# Patient Record
Sex: MALE | Race: AMERICAN INDIAN OR ALASKA NATIVE
[De-identification: names, ages, dates, MRNs, and addresses within clinical notes are randomized per-mention and may not be internally consistent; named-entity substitution may affect disease eponyms.]

---

## 2021-03-29 ENCOUNTER — HOSPITAL ENCOUNTER (EMERGENCY)
Dept: HOSPITAL 5 - ED | Age: 26
Discharge: HOME | End: 2021-03-29
Payer: SELF-PAY

## 2021-03-29 VITALS — SYSTOLIC BLOOD PRESSURE: 127 MMHG | DIASTOLIC BLOOD PRESSURE: 60 MMHG

## 2021-03-29 DIAGNOSIS — K05.10: ICD-10-CM

## 2021-03-29 DIAGNOSIS — K02.9: Primary | ICD-10-CM

## 2021-03-29 DIAGNOSIS — Z79.899: ICD-10-CM

## 2021-03-29 PROCEDURE — 99282 EMERGENCY DEPT VISIT SF MDM: CPT

## 2021-03-29 NOTE — EMERGENCY DEPARTMENT REPORT
ED ENT HPI





- General


Chief complaint: Dental/Oral


Stated complaint: ABCESS


Time Seen by Provider: 03/29/21 12:03


Source: patient


Mode of arrival: Ambulatory


Limitations: No Limitations





- History of Present Illness


Initial comments: 





Patient is a 25-year-old male who presents emergency room with complaints of 

left lower dental pain that began yesterday.  He states that he has broken teeth

present in that region for several years.  He states that over the last couple 

days he has had pain and swelling to the gumline.  He states he last saw a 

dentist a year ago.  He states he has an appointment with his dentist on 

4/1/2021.  He denies any fever, difficulty swallowing, difficulty breathing, 

facial swelling, vomiting, chills.  Patient denies any past medical history.  No

allergies medications.





- Related Data


                                  Previous Rx's











 Medication  Instructions  Recorded  Last Taken  Type


 


Chlorhexidine Mouthwash [Peridex] 15 ml MM BID #1 bottle 03/29/21 Unknown Rx


 


Naproxen [EC-Naprosyn] 500 mg PO BID PRN #14 tablet. 03/29/21 Unknown Rx


 


Penicillin Vk [Veetids TAB] 500 mg PO QID 7 Days #56 tablet 03/29/21 Unknown Rx











                                    Allergies











Allergy/AdvReac Type Severity Reaction Status Date / Time


 


No Known Allergies Allergy   Unverified 03/29/21 11:32














ED Dental HPI





- General


Chief complaint: Dental/Oral


Stated complaint: ABCESS


Time Seen by Provider: 03/29/21 12:03


Source: patient


Mode of arrival: Ambulatory


Limitations: No Limitations





- Related Data


                                  Previous Rx's











 Medication  Instructions  Recorded  Last Taken  Type


 


Chlorhexidine Mouthwash [Peridex] 15 ml MM BID #1 bottle 03/29/21 Unknown Rx


 


Naproxen [EC-Naprosyn] 500 mg PO BID PRN #14 tablet. 03/29/21 Unknown Rx


 


Penicillin Vk [Veetids TAB] 500 mg PO QID 7 Days #56 tablet 03/29/21 Unknown Rx











                                    Allergies











Allergy/AdvReac Type Severity Reaction Status Date / Time


 


No Known Allergies Allergy   Unverified 03/29/21 11:32














ED Review of Systems


ROS: 


Stated complaint: ABCESS


Other details as noted in HPI





Comment: All other systems reviewed and negative





ED Past Medical Hx





- Past Medical History


Previous Medical History?: No





- Surgical History


Past Surgical History?: No





- Social History


Smoking Status: Never Smoker


Substance Use Type: None





- Medications


Home Medications: 


                                Home Medications











 Medication  Instructions  Recorded  Confirmed  Last Taken  Type


 


Chlorhexidine Mouthwash [Peridex] 15 ml MM BID #1 bottle 03/29/21  Unknown Rx


 


Naproxen [EC-Naprosyn] 500 mg PO BID PRN #14 tablet. 03/29/21  Unknown Rx


 


Penicillin Vk [Veetids TAB] 500 mg PO QID 7 Days #56 tablet 03/29/21  Unknown Rx














ED Physical Exam





- General


Limitations: No Limitations


General appearance: alert, in no apparent distress





- Head


Head exam: Present: atraumatic, normocephalic





- Eye


Eye exam: Present: normal appearance





- ENT


ENT exam: Present: mucous membranes moist, other (dental carries with missing 

teeth present to the left lower molars, there is induration of the associated 

gumline, no obvious area of fluctuance, uvula is midline, no uvular edema or 

deviation, no trismus, no tongue elevation, no muffled voice, no submandibular 

edema)





- Respiratory


Respiratory exam: Absent: respiratory distress, accessory muscle use





- Neurological Exam


Neurological exam: Present: alert, oriented X3





- Psychiatric


Psychiatric exam: Present: normal affect, normal mood





- Skin


Skin exam: Present: warm, dry, intact





ED Course


                                   Vital Signs











  03/29/21





  11:35


 


Temperature 98.6 F


 


Pulse Rate 88


 


Respiratory 16





Rate 


 


Blood Pressure 127/60


 


O2 Sat by Pulse 99





Oximetry 














ED Medical Decision Making





- Medical Decision Making





Patient is a 25-year-old male who presents emergency room with complaints of 

left lower dental pain that began yesterday.  He states that he has broken teeth

present in that region for several years.  He states that over the last couple 

days he has had pain and swelling to the gumline.  He states he last saw a 

dentist a year ago.  He states he has an appointment with his dentist on 

4/1/2021.  He denies any fever, difficulty swallowing, difficulty breathing, 

facial swelling, vomiting, chills.  Patient denies any past medical history.  No

allergies medications. vitals are normal. on exam:dental carries with missing 

teeth present to the left lower molars, there is induration of the associated 

gumline, no obvious area of fluctuance, uvula is midline, no uvular edema or 

deviation, no trismus, no tongue elevation, no muffled voice, no submandibular 

edema.  Examination appears consistent with infected dental caries and 

gingivitis.  No signs of Cain's, facial abscess, facial cellulitis at this 

time.  Patient given prescription for penicillin VK, chlorhexidine mouthwash, 

naproxen.  Advised patient Please use medication as prescribed.  Gargle with 

warm salt water.  Follow-up with your dentist.  It is very important that you 

follow-up.  Return to emergency room for any new or worsening symptoms.


Critical care attestation.: 


If time is entered above; I have spent that time in minutes in the direct care 

of this critically ill patient, excluding procedure time.








ED Disposition


Clinical Impression: 


 Infected dental caries, Gingivitis





Disposition: DC-01 TO HOME OR SELFCARE


Is pt being admited?: No


Does the pt Need Aspirin: No


Condition: Stable


Additional Instructions: 


Please use medication as prescribed.  Gargle with warm salt water.  Follow-up 

with your dentist.  It is very important that you follow-up.  Return to 

emergency room for any new or worsening symptoms.


Prescriptions: 


Naproxen [EC-Naprosyn] 500 mg PO BID PRN #14 tablet.dr


 PRN Reason: pain


Chlorhexidine Mouthwash [Peridex] 15 ml MM BID #1 bottle


Penicillin Vk [Veetids TAB] 500 mg PO QID 7 Days #56 tablet


Referrals: 


your, dentist [Other] - 2-3 Days


Time of Disposition: 12:08


Print Language: ENGLISH

## 2021-04-13 ENCOUNTER — HOSPITAL ENCOUNTER (EMERGENCY)
Dept: HOSPITAL 5 - ED | Age: 26
Discharge: LEFT BEFORE DISCHARGE | End: 2021-04-13
Payer: SELF-PAY

## 2021-04-13 VITALS — SYSTOLIC BLOOD PRESSURE: 143 MMHG | DIASTOLIC BLOOD PRESSURE: 53 MMHG

## 2021-04-13 DIAGNOSIS — Z53.21: ICD-10-CM

## 2021-04-13 DIAGNOSIS — T78.40XA: Primary | ICD-10-CM

## 2021-04-13 NOTE — EMERGENCY DEPARTMENT REPORT
Chief Complaint: Medical Clearance


Stated Complaint: ALLGERIES


Time Seen by Provider: 04/13/21 10:45





- HPI


History of Present Illness: 





Patient is a 25-year-old male who presents emergency room with complaints of an 

exacerbation of his seasonal allergies that began this morning.  He states that 

he has a history of seasonal allergies whenever the pollen comes out.  He has 

associated itching, watering eyes, rhinorrhea, sinus pressure, headache.  He 

states all his symptoms began this morning.  He states he took a Benadryl and 

Claritin this morning.  He states that he was unable to go to his probation 

class this morning and needed an excuse.  He denies any fever, nausea, vomiting,

diarrhea, chest pain, shortness of breath.  He denies any known sick contacts or

recent travel.  No other past medical history.  No allergies to medications.  He

states he has been intermittently been on Claritin for several years.








Vitals are stable








On exam:


Non toxic appearing, no acute distress


atraumatic, normocephalic


normal appearance of the eyes, PERRL, EOMI, no periorbital edema or ecchymosis


moist mucus membranes, normal oropharynx, normal TMs and canals, pale boggy 

turbinates with small amount of clear nasal drainage bilaterally, no erythema, 

no purulent drainage, no sinus ttp bilaterally


regular heart rate and rhythm, no gallops, no rubs, no murmurs


breath sounds are clear bilaterally, no w/r/r, no respiratory distress, no 

accessory muscle use, no stridor


A&O x4, no focal neuro deficit


skin is warm, dry, intact











Patient is presenting with signs of allergic rhinitis


No signs of bacterial sinusitis


Advised patient Please use Zyrtec or Allegra over-the-counter.  Increase your 

water intake.  Please use Flonase nasal spray.  May use a humidifier.  Please 

avoid your allergy triggers.  Follow-up with your primary care doctor.  Return 

to emergency room for any new or worsening symptoms.


Patient referred to appropriate resources


Discuss strict return precautions








Medical screening examination performed and there is no threat to life or limb 

at this time





- Exam


Vital Signs: 


                                   Vital Signs











  04/13/21





  10:07


 


Temperature 99.1 F


 


Pulse Rate 100 H


 


Respiratory 18





Rate 


 


Blood Pressure 151/85





[Right] 


 


O2 Sat by Pulse 96





Oximetry 








                                   Vital Signs











  04/13/21 04/13/21





  10:07 11:03


 


Temperature 99.1 F 


 


Pulse Rate 100 H 97 H


 


Respiratory 18 20





Rate  


 


Blood Pressure  143/53


 


Blood Pressure 151/85 





[Right]  


 


O2 Sat by Pulse 96 95





Oximetry  











MSE screening note: 


Focused history and physical exam performed.


Due to findings the following was ordered:











ED Disposition for MSE


Clinical Impression: 


Allergies


Qualifiers:


 Encounter type: initial encounter Qualified Code(s): T78.40XA - Allergy, 

unspecified, initial encounter





Disposition: Z-07 MED SCREENING EXAM-LEFT


Is pt being admited?: No


Does the pt Need Aspirin: No


Condition: Stable


Instructions:  Allergies, Adult, Easy-to-Read


Additional Instructions: 


Please use Zyrtec or Allegra over-the-counter.  Increase your water intake.  

Please use Flonase nasal spray.  May use a humidifier.  Please avoid your 

allergy triggers.  Follow-up with your primary care doctor.  Return to emergency

 room for any new or worsening symptoms.


Referrals: 


LUCIE WING MD [Staff Physician] - 3-5 Days


[facility] [Provider Group] - 3-5 Days


[facility], [LAB/CONTRACT] - 3-5 Days


[facility] [Outside] - 3-5 Days


Forms:  Work/School Release Form(ED)


Time of Disposition: 10:52


Print Language: ENGLISH

## 2021-12-14 ENCOUNTER — HOSPITAL ENCOUNTER (EMERGENCY)
Dept: HOSPITAL 5 - ED | Age: 26
Discharge: LEFT BEFORE DISCHARGE | End: 2021-12-14
Payer: SELF-PAY

## 2021-12-14 VITALS — DIASTOLIC BLOOD PRESSURE: 65 MMHG | SYSTOLIC BLOOD PRESSURE: 144 MMHG

## 2021-12-14 DIAGNOSIS — M79.18: ICD-10-CM

## 2021-12-14 DIAGNOSIS — R74.8: ICD-10-CM

## 2021-12-14 DIAGNOSIS — E86.0: Primary | ICD-10-CM

## 2021-12-14 DIAGNOSIS — R74.01: ICD-10-CM

## 2021-12-14 DIAGNOSIS — R80.9: ICD-10-CM

## 2021-12-14 DIAGNOSIS — Z79.899: ICD-10-CM

## 2021-12-14 DIAGNOSIS — E80.7: ICD-10-CM

## 2021-12-14 LAB
ALBUMIN SERPL-MCNC: 5 G/DL (ref 3.9–5)
ALT SERPL-CCNC: 37 UNITS/L (ref 7–56)
BASOPHILS # (AUTO): 0.1 K/MM3 (ref 0–0.1)
BASOPHILS NFR BLD AUTO: 1.6 % (ref 0–1.8)
BILIRUB UR QL STRIP: (no result)
BLOOD UR QL VISUAL: (no result)
BUN SERPL-MCNC: 11 MG/DL (ref 9–20)
BUN/CREAT SERPL: 11 %
CALCIUM SERPL-MCNC: 9.4 MG/DL (ref 8.4–10.2)
EOSINOPHIL # BLD AUTO: 0.5 K/MM3 (ref 0–0.4)
EOSINOPHIL NFR BLD AUTO: 9.3 % (ref 0–4.3)
HCT VFR BLD CALC: 49.4 % (ref 35.5–45.6)
HEMOLYSIS INDEX: 11
HGB BLD-MCNC: 16.7 GM/DL (ref 11.8–15.2)
HYALINE CASTS #/AREA URNS LPF: 2 /LPF
LYMPHOCYTES # BLD AUTO: 1.5 K/MM3 (ref 1.2–5.4)
LYMPHOCYTES NFR BLD AUTO: 29.6 % (ref 13.4–35)
MCHC RBC AUTO-ENTMCNC: 34 % (ref 32–34)
MCV RBC AUTO: 82 FL (ref 84–94)
MONOCYTES # (AUTO): 0.6 K/MM3 (ref 0–0.8)
MONOCYTES % (AUTO): 11.1 % (ref 0–7.3)
MUCOUS THREADS #/AREA URNS HPF: (no result) /HPF
PH UR STRIP: 5 [PH] (ref 5–7)
PLATELET # BLD: 293 K/MM3 (ref 140–440)
RBC # BLD AUTO: 6 M/MM3 (ref 3.65–5.03)
RBC #/AREA URNS HPF: 1 /HPF (ref 0–6)
UROBILINOGEN UR-MCNC: 4 MG/DL (ref ?–2)
WBC #/AREA URNS HPF: 1 /HPF (ref 0–6)

## 2021-12-14 PROCEDURE — 82550 ASSAY OF CK (CPK): CPT

## 2021-12-14 PROCEDURE — 96360 HYDRATION IV INFUSION INIT: CPT

## 2021-12-14 PROCEDURE — 36415 COLL VENOUS BLD VENIPUNCTURE: CPT

## 2021-12-14 PROCEDURE — 80053 COMPREHEN METABOLIC PANEL: CPT

## 2021-12-14 PROCEDURE — 85025 COMPLETE CBC W/AUTO DIFF WBC: CPT

## 2021-12-14 PROCEDURE — 81001 URINALYSIS AUTO W/SCOPE: CPT

## 2021-12-14 PROCEDURE — 99283 EMERGENCY DEPT VISIT LOW MDM: CPT

## 2021-12-14 NOTE — EMERGENCY DEPARTMENT REPORT
ED General Adult HPI





- General


Chief complaint: Pain General


Stated complaint: BODY PAIN


Time Seen by Provider: 12/14/21 12:41


Source: patient


Mode of arrival: Ambulatory


Limitations: No Limitations





- History of Present Illness


Initial comments: 





Patient is a 26-year-old male presents emergency room with complaints of muscle 

cramping and aches in his lower extremities that began today.  Patient states 

yesterday he was moving heavy furniture all day long and does not believe he was

drinking of water.  He denies any fall or injury.  He states he feels 

generalized weakness he denies any urinary symptoms, urinary frequency, dark 

urine, tea colored urine, back pain.  He denies any nausea, vomiting, diarrhea, 

fever, chills.  No allergies to medications.  No past medical history.  Patient 

is a smoker.





- Related Data


                                  Previous Rx's











 Medication  Instructions  Recorded  Last Taken  Type


 


Chlorhexidine Mouthwash [Peridex] 15 ml MM BID #1 bottle 03/29/21 Unknown Rx


 


Naproxen [EC-Naprosyn] 500 mg PO BID PRN #14 tablet. 03/29/21 Unknown Rx


 


Penicillin Vk [Veetids TAB] 500 mg PO QID 7 Days #56 tablet 03/29/21 Unknown Rx











                                    Allergies











Allergy/AdvReac Type Severity Reaction Status Date / Time


 


No Known Allergies Allergy   Verified 12/14/21 11:44














ED Review of Systems


ROS: 


Stated complaint: BODY PAIN


Other details as noted in HPI





Comment: All other systems reviewed and negative





ED Past Medical Hx





- Social History


Smoking Status: Never Smoker


Substance Use Type: None





- Medications


Home Medications: 


                                Home Medications











 Medication  Instructions  Recorded  Confirmed  Last Taken  Type


 


Chlorhexidine Mouthwash [Peridex] 15 ml MM BID #1 bottle 03/29/21  Unknown Rx


 


Naproxen [EC-Naprosyn] 500 mg PO BID PRN #14 tablet. 03/29/21  Unknown Rx


 


Penicillin Vk [Veetids TAB] 500 mg PO QID 7 Days #56 tablet 03/29/21  Unknown Rx














ED Physical Exam





- General


Limitations: No Limitations


General appearance: alert, in no apparent distress





- Head


Head exam: Present: atraumatic, normocephalic





- Eye


Eye exam: Present: normal appearance





- ENT


ENT exam: Present: mucous membranes moist





- Respiratory


Respiratory exam: Present: normal lung sounds bilaterally.  Absent: respiratory 

distress, wheezes, rales, rhonchi, stridor, chest wall tenderness, accessory 

muscle use, decreased breath sounds, prolonged expiratory





- Cardiovascular


Cardiovascular Exam: Present: regular rate, normal rhythm, normal heart sounds. 

Absent: systolic murmur, diastolic murmur, rubs, gallop





- Extremities Exam


Extremities exam: Present: normal inspection, full ROM.  Absent: tenderness





- Neurological Exam


Neurological exam: Present: alert, oriented X3





- Psychiatric


Psychiatric exam: Present: normal affect, normal mood





- Skin


Skin exam: Present: warm, dry, intact





ED Course


                                   Vital Signs











  12/14/21





  11:44


 


Temperature 98 F


 


Pulse Rate 89


 


Respiratory 16





Rate 


 


Blood Pressure 144/65





[Left] 


 


O2 Sat by Pulse 100





Oximetry 














ED Medical Decision Making





- Lab Data


Result diagrams: 


                                 12/14/21 12:56





                                 12/14/21 12:56








                                   Lab Results











  12/14/21 12/14/21 12/14/21 Range/Units





  12:56 12:56 Unknown 


 


WBC  5.1    (4.5-11.0)  K/mm3


 


RBC  6.00 H    (3.65-5.03)  M/mm3


 


Hgb  16.7 H    (11.8-15.2)  gm/dl


 


Hct  49.4 H    (35.5-45.6)  %


 


MCV  82 L    (84-94)  fl


 


MCH  28    (28-32)  pg


 


MCHC  34    (32-34)  %


 


RDW  12.9 L    (13.2-15.2)  %


 


Plt Count  293    (140-440)  K/mm3


 


Lymph % (Auto)  29.6    (13.4-35.0)  %


 


Mono % (Auto)  11.1 H    (0.0-7.3)  %


 


Eos % (Auto)  9.3 H    (0.0-4.3)  %


 


Baso % (Auto)  1.6    (0.0-1.8)  %


 


Lymph # (Auto)  1.5    (1.2-5.4)  K/mm3


 


Mono # (Auto)  0.6    (0.0-0.8)  K/mm3


 


Eos # (Auto)  0.5 H    (0.0-0.4)  K/mm3


 


Baso # (Auto)  0.1    (0.0-0.1)  K/mm3


 


Seg Neutrophils %  48.4    (40.0-70.0)  %


 


Seg Neutrophils #  2.5    (1.8-7.7)  K/mm3


 


Sodium   139   (137-145)  mmol/L


 


Potassium   3.8   (3.6-5.0)  mmol/L


 


Chloride   100.6   ()  mmol/L


 


Carbon Dioxide   27   (22-30)  mmol/L


 


Anion Gap   15   mmol/L


 


BUN   11   (9-20)  mg/dL


 


Creatinine   1.0   (0.8-1.3)  mg/dL


 


Estimated GFR   > 60   ml/min


 


BUN/Creatinine Ratio   11   %


 


Glucose   121 H   ()  mg/dL


 


Calcium   9.4   (8.4-10.2)  mg/dL


 


Total Bilirubin   1.50 H   (0.1-1.2)  mg/dL


 


AST   52 H   (5-40)  units/L


 


ALT   37   (7-56)  units/L


 


Alkaline Phosphatase   87   ()  units/L


 


Total Creatine Kinase   1522 H   ()  units/L


 


Total Protein   7.6   (6.3-8.2)  g/dL


 


Albumin   5.0   (3.9-5)  g/dL


 


Albumin/Globulin Ratio   1.9   %


 


Urine Color    Latesha  (Yellow)  


 


Urine Turbidity    Clear  (Clear)  


 


Urine pH    5.0  (5.0-7.0)  


 


Ur Specific Gravity    1.035 H  (1.003-1.030)  


 


Urine Protein    100 mg/dl  (Negative)  mg/dL


 


Urine Glucose (UA)    Neg  (Negative)  mg/dL


 


Urine Ketones    Tr  (Negative)  mg/dL


 


Urine Blood    Neg  (Negative)  


 


Urine Nitrite    Neg  (Negative)  


 


Urine Bilirubin    Neg  (Negative)  


 


Urine Urobilinogen    4.0  (<2.0)  mg/dL


 


Ur Leukocyte Esterase    Neg  (Negative)  


 


Urine WBC (Auto)    1.0  (0.0-6.0)  /HPF


 


Urine RBC (Auto)    1.0  (0.0-6.0)  /HPF


 


U Epithel Cells (Auto)    < 1.0  (0-13.0)  /HPF


 


Hyaline Casts    2  /LPF


 


Urine Mucus    1+  /HPF














- Medical Decision Making





Patient is a 26-year-old male presents emergency room with complaints of muscle 

cramping and aches in his lower extremities that began today.  Patient states 

yesterday he was moving heavy furniture all day long and does not believe he was

 drinking of water.  He denies any fall or injury.  He states he feels 

generalized weakness he denies any urinary symptoms, urinary frequency, dark 

urine, tea colored urine, back pain.  He denies any nausea, vomiting, diarrhea, 

fever, chills.  No allergies to medications.  No past medical history.  Patient 

is a smoker.  Vitals are stable.  Labs with elevated hemoglobin and hematocrit, 

likely due to concentration from dehydration.  CK elevated at 1522.  Mild e

levation in AST and bilirubin.  Discussed findings with patient.  Discussed that

 this could likely be due to muscle breakdown from his heavy lifting yesterday 

and not drinking enough water.  I discussed with patient that he should be 

drinking 2 to 3 L of water a day.  Advised patient that we need to give him 2 L 

of IV fluids and reassess.  I was informed by nurse that patient wanted to leave

 AGAINST MEDICAL ADVICE as he states he missed his probation visit, I advised 

patient that we can give him an excuse for missing probation but he needed to 

complete his fluid resuscitation, patient states that he would like to leave.











The patient is alert and oriented x3.  The patient exhibits decision-making 

capacity.  The patient is free from distracting injury.  The risk of leaving 

without a complete medical examination, and AGAINST MEDICAL ADVICE, were 

explained to the patient, and they included death, disability, paralysis, 

permanent loss of quality of life.  Patient verbalized understanding to these 

and was able to articulate these risk in their own words and this was witnessed 

by Melissa paramedic


Critical care attestation.: 


If time is entered above; I have spent that time in minutes in the direct care 

of this critically ill patient, excluding procedure time.








ED Disposition


Clinical Impression: 


 Muscle cramping, Dehydration, Elevated CK, Elevated AST (SGOT), Elevated 

bilirubin





Proteinuria


Qualifiers:


 Proteinuria type: unspecified Qualified Code(s): R80.9 - Proteinuria, 

unspecified





Disposition: 07 LEFT AGAINST MEDICAL ADVICE


Is pt being admited?: No


Does the pt Need Aspirin: No


Condition: Undetermined


Instructions:  Muscle Cramps and Spasms, Easy-to-Read, Rhabdomyolysis


Additional Instructions: 


Please increase your fluid intake over the next several days.  Drink 2 to 3 L of

 water a day.  Please avoid heavy lifting or strenuous exercise.  Follow-up with

 a primary care doctor to have your labs repeated in the next 2 to 3 days.  

Return to emergency room for any new or worsening symptoms.


Referrals: 


PRIMARY CARE,MD [Primary Care Provider] - 2-3 Days


Forms:  Work/School Release Form(ED)


Time of Disposition: 15:05


Print Language: ENGLISH

## 2021-12-29 ENCOUNTER — HOSPITAL ENCOUNTER (EMERGENCY)
Dept: HOSPITAL 5 - ED | Age: 26
Discharge: LEFT BEFORE DISCHARGE | End: 2021-12-29
Payer: SELF-PAY

## 2021-12-29 DIAGNOSIS — Z53.21: ICD-10-CM

## 2021-12-29 DIAGNOSIS — M54.9: Primary | ICD-10-CM

## 2021-12-30 ENCOUNTER — HOSPITAL ENCOUNTER (EMERGENCY)
Dept: HOSPITAL 5 - ED | Age: 26
Discharge: LEFT BEFORE DISCHARGE | End: 2021-12-30
Payer: SELF-PAY

## 2021-12-30 VITALS — SYSTOLIC BLOOD PRESSURE: 118 MMHG | DIASTOLIC BLOOD PRESSURE: 72 MMHG

## 2021-12-30 DIAGNOSIS — M54.9: Primary | ICD-10-CM

## 2021-12-30 DIAGNOSIS — Z53.21: ICD-10-CM
